# Patient Record
(demographics unavailable — no encounter records)

---

## 2025-03-14 NOTE — REVIEW OF SYSTEMS
[Rash] : rash [Polyuria] : polyuria [Negative] : Neurological [Dysuria] : no dysuria [Hematuria] : no hematuria [Vaginal Dischage] : no vaginal discharge [Vaginal Itch] : no vaginal itch

## 2025-03-14 NOTE — HISTORY OF PRESENT ILLNESS
[de-identified] : increased urinary frequency over the past several months [FreeTextEntry6] : Pt is a 4yo f w/ no pmhx presenting for increased urinary frequency over the past several months. Per MOC, school has been complaining that Sophie has been "going to the toilet too much", sometimes "two times in ten minutes", going "twenty times in one day." MOC now noticing daytime accidents, pt had previously been dry during the daytime, wears diapers at night with occasional overnight accidents. Experiencing same sx at home and at school. (of note, pt got up to leave to urinate 4x during the interview and exam). Will lose urine when playing, coughing/sneezing.  Per MOC, she "had some tests done" several months ago to work up this problem - which were "all normal - no sugar". MOC believed pt's urination to be normal. Normal stooling pattern. +intermittent abd pain, +foul smelling urine. normal thirst. Using OTC diaper rash cream for erythematous "diaper rash" when present.  No fevers, dysuria, abd pain, flank pain, abnormal discharge, diarrhea, vomiting, hematuria, weight loss.   No recent travel, no one sick at home.   Fam Hx: +DM (paternal grandparents; developed in adulthood); cousin +T1DM on insulin; no other autoimmune conditions

## 2025-03-14 NOTE — DISCUSSION/SUMMARY
[FreeTextEntry1] : Pt is a 5y F w/ eczema p/w increased urinary frequency and urgency x several months. Sx occurring both at home and school, now having daytime accidents in addition to nocturnal enuresis. VS wnl. Pt needing to stop interview/exam to void multiple times; leakage of urine present during  exam. Given timeline and circumstances of urine loss, concerning for bladder spasm vs musculature abnormality / weakness vs structural abnormality (e.g. fistula).   #Increased Urinary Frequency - Referral to peds urology given - Low suspicion for infeciton given absence of accompanying dysuria, hematuria, vaginal discharge, or abd/flank pain - negative Udip in office today, will forego ucx and ua.  - Family hx of t1dm (cousin recently diagnosed; likely t2dm in paternal grandparents); but low suspicion for DM given normal thirst and absence of weight loss.   #HCM - triamcinolone bid prn eczematous rash renewed today - RTC for WCC or earlier prn

## 2025-03-14 NOTE — PHYSICAL EXAM
[Alert] : alert [EOMI] : grossly EOMI [Pink Nasal Mucosa] : pink nasal mucosa [Supple] : supple [FROM] : full passive range of motion [Clear to Auscultation Bilaterally] : clear to auscultation bilaterally [Regular Rate and Rhythm] : regular rate and rhythm [Normal S1, S2 audible] : normal S1, S2 audible [Soft] : soft [Ki: ____] : Ki [unfilled] [Normal External Genitalia] : normal external genitalia [No Abnormal Lymph Nodes Palpated] : no abnormal lymph nodes palpated [Moves All Extremities x 4] : moves all extremities x4 [Warm, Well Perfused x4] : warm, well perfused x4 [Warm] : warm [Clear] : clear [Acute Distress] : no acute distress [Tired appearing] : not tired appearing [Lethargic] : not lethargic [Erythematous Oropharynx] : nonerythematous oropharynx [Murmur] : no murmur [Tender] : nontender [Distended] : nondistended [Hepatosplenomegaly] : no hepatosplenomegaly [Vaginal Discharge] : no vaginal discharge [FreeTextEntry2] : ncat [FreeTextEntry6] : exam performed w/ chaperone; no rash; leakage of urine observed during exam

## 2025-03-14 NOTE — BEGINNING OF VISIT
[] :  [Language Line ] : provided by Language Line   [Mother] : mother [Interpreters_IDNumber] : [id number] [Interpreters_FullName] : Mindy Zabala

## 2025-05-30 NOTE — CONSULT LETTER
[FreeTextEntry1] : Dear Dr. Smith,   I had the pleasure of seeing Sophie for follow up today. Below is my note regarding the office visit today.    Thank you so very much for allowing me to participate in Sophie's care. Please don't hesitate to call me should any questions or issues arise .    Sincerely,    Brian Marquis MD, FACS, [facility]    Chief, Pediatric Urology    Professor of Urology and Pediatrics    [employer]    President, [employer] Association - [place] Section    Past-President, Societies for Pediatric U[employer]

## 2025-05-30 NOTE — DISCUSSION/SUMMARY
[FreeTextEntry1] : Pt appears to have a viral gastroenteritis.  Parent was educated about course of illness and supportive care was recommended.  Advance diet as tolerated and continue to promote liquids (encouraged to give small sips of liquid q 15-30 minutes). Parent was educated on S/S of worsening condition (inability to keep down liquids, no/decreased UOP, dry mouth, no tears, worsening pain, bilious emesis, bloody/mucous stools) and when to RTC vs take child to ED.  Return to clinic for new, persistent, or worsening symptoms.  Parent expressed understanding and agreement with plan.

## 2025-05-30 NOTE — HISTORY OF PRESENT ILLNESS
[TextBox_4] : Sophie is here for consultation today. She is a 5- year old, [id number], female who presents for urinary frequency since being toilet trained at age 3. Voids 20+ times per day with immediate initiation of a continuous stream. The bladder does not feel empty after voiding. There is no difference between school days and home (school days, weekends or vacation).Has daily daytime accidents. No hematuria, dysuria, or other voiding complaints. No antecedent infections or known injuries to the kidneys or genital area. Has daily bowel movements with associate straining. Denies pain or bleeding. No previous use of stool softeners/laxatives.

## 2025-05-30 NOTE — CONSULT LETTER
[FreeTextEntry1] : Dear Dr. Smith,   I had the pleasure of seeing Sophie for follow up today. Below is my note regarding the office visit today.    Thank you so very much for allowing me to participate in Sophie's care. Please don't hesitate to call me should any questions or issues arise .    Sincerely,    Brian Marquis MD, FACS, [facility]    Chief, Pediatric Urology    Professor of Urology and Pediatrics    [employer]    President, American [employer] Association - [place] Section    Past-President, Societies [employer] Pediatric U[employer]

## 2025-05-30 NOTE — DATA REVIEWED
[FreeTextEntry1] : EXAMINATION: US RENAL AND PELVIS TODAY IN OFFICE FINDINGS:  UNREMARKABLE KIDNEY AND PELVIC STRUCTURES RECTAL DIAMETER: 3.4CM  URINALYSIS OBTAINED TODAY IN OFFICE UNREMARKABLE

## 2025-05-30 NOTE — HISTORY OF PRESENT ILLNESS
[TextBox_4] : Sophie is here for consultation today. She is a 5- year old, [id number], female who presents for urinary frequency since being toilet trained at age 3. Voids 20+ times per day with immediate initiation of a continuous stream. The bladder does not feel empty after voiding. There is no difference between school days and home (school days, weekends or vacation).Has daily daytime accidents. No hematuria, dysuria, or other voiding complaints. No antecedent infections or known injuries to the kidneys or genital area. Has daily bowel movements with associate straining. Denies pain or bleeding. No previous use of stool softeners/laxatives.

## 2025-05-30 NOTE — REASON FOR VISIT
[Initial Consultation] : an initial consultation [PCP] : ~pcp~ [Mother] : mother [Language Line ] : provided by Language Line   [TextBox_50] : urinary frequency [Interpreters_IDNumber] : [id number] [Interpreters_FullName] : Mindy [TWNoteComboBox1] : Johnathan

## 2025-05-30 NOTE — ASSESSMENT
[FreeTextEntry1] : Sophie has urinary frequency and daytime incontinence. Today's renal/bladder ultrasound demonstrated a small stool burden within a dilated rectum (3.4cm). Urinalysis was unremarkable. We discussed possible causes and contributors of this issue, as well as management options. The following plan was discussed:     - Will send urine sample to rule out hypercalciuria  - Bowel management: start daily Fiber  - Start Oxybutynin for symptom relief (2.5mg BID) - Follow up for voiding studies in 6-8 weeks to assess for dysfunctional voiding    Sophie and her mother verbalize understanding of the plan. All questions were addressed and answered to their satisfaction.

## 2025-05-30 NOTE — REASON FOR VISIT
[Initial Consultation] : an initial consultation [PCP] : ~pcp~ [Mother] : mother [Language Line ] : provided by Language [employer]   [TextBox_50] : urinary frequency [Interpreters_IDNumber] : [id number] [Interpreters_FullName] : Mindy [TWNoteComboBox1] : Johnathan